# Patient Record
Sex: FEMALE | Race: OTHER
[De-identification: names, ages, dates, MRNs, and addresses within clinical notes are randomized per-mention and may not be internally consistent; named-entity substitution may affect disease eponyms.]

---

## 2018-04-07 ENCOUNTER — HOSPITAL ENCOUNTER (EMERGENCY)
Dept: HOSPITAL 62 - ER | Age: 23
Discharge: HOME | End: 2018-04-07
Payer: SELF-PAY

## 2018-04-07 VITALS — DIASTOLIC BLOOD PRESSURE: 69 MMHG | SYSTOLIC BLOOD PRESSURE: 108 MMHG

## 2018-04-07 DIAGNOSIS — N76.4: Primary | ICD-10-CM

## 2018-04-07 DIAGNOSIS — N76.0: ICD-10-CM

## 2018-04-07 PROCEDURE — 0H9AXZZ DRAINAGE OF INGUINAL SKIN, EXTERNAL APPROACH: ICD-10-PCS

## 2018-04-07 PROCEDURE — 99283 EMERGENCY DEPT VISIT LOW MDM: CPT

## 2018-04-07 NOTE — ER DOCUMENT REPORT
ED Skin Rash/Insect Bite/Abscs





- General


Chief Complaint: Abscess


Stated Complaint: BUMP


Time Seen by Provider: 04/07/18 17:25


Mode of Arrival: Ambulatory


Information source: Patient


TRAVEL OUTSIDE OF THE [place] IN LAST 30 DAYS: No





- HPI


Patient complains to provider of: Tender/swollen area


Notes: 





Patient is here with complaints of tender swollen area in her vaginal area for 

the last few days.  States it seems to be getting bigger and more painful.  She 

denies any fevers.  She does report a prior history of having abscess in the 

past.  States that this was approximately 2 years ago.  She denies any nausea, 

vomiting, diarrhea.  No abdominal pain.  No dysuria or hematuria.  No 

difficulty urinating.  Pain is worse with walking and touching the area, 

nothing seems to make it better.  The patient denies any further complaints at 

this time.





- Related Data


Allergies/Adverse Reactions: 


 





No Known Allergies Allergy (Verified 04/07/18 17:05)


 











Past Medical History





- Social History


Smoking Status: Unknown if Ever Smoked


Family History: Reviewed & Not Pertinent


Pulmonary Medical History: 


   Denies: Hx Asthma


Neurological Medical History: Reports: Hx Migraine


GI Medical History: Denies: Hx Gastritis, Hx Gastroesophageal Reflux Disease


Musculoskeltal Medical History: Denies Hx Arthritis, Denies Hx Fibromyalgia, 

Denies Hx Muscular Dystrophy, Denies Hx Musculoskeletal Trauma


Psychiatric Medical History: 


   Denies: Hx Anxiety


Past Surgical History: Reports: Hx Oral Surgery





- Immunizations


Immunizations up to date: Yes


Hx Diphtheria, Pertussis, Tetanus Vaccination: Yes - 2012





Review of Systems





- Review of Systems


-: Yes All other systems reviewed and negative





Physical Exam





- Vital signs


Vitals: 


 











Temp Pulse Resp BP Pulse Ox


 


 97.9 F   70   14   108/69   100 


 


 04/07/18 17:15  04/07/18 17:15  04/07/18 17:15  04/07/18 17:15  04/07/18 17:15














- Notes


Notes: 





GENERAL: alert, cooperative, nontoxic, no distress.


HEAD: normocephalic, atraumatic


EYES: conjunctiva pink without discharge, no external redness or swelling.


EARS: no external swelling, no external redness


NOSE: atraumatic, no external swelling


MOUTH/THROAT: mucous membranes moist and pink, posterior pharynx without 

erythema, swelling, exudate. No trismus or drooling.


NECK: soft, supple, full range of motion, no meningismus.


CHEST: no distress, lungs clear and equal throughout.  No wheezing, rales, 

rhonchi.


CARDIAC: regular rate and rhythm, no murmur, normal capillary refill, normal 

pulses.  No peripheral edema noted.


ABDOMEN: Soft, nontender.


BACK: full range of motion, no CVA tenderness.


EXTREMITIES: full range of motion of all extremities.  No redness, no swelling.


NEURO: alert and oriented x 3, no focal deficits, full range of motion of all 

extremities.


PYSCH: appropriate mood, affect.  Patient is cooperative.


SKIN: pink, warm, dry, no rash.


: Performed with female chaperone at the bedside.  Patient is noted to have a 

1 cm abscess to the labia minora at the superior aspect on the left side.  No 

significant cellulitis identified.  This is tender to palpation.  It does not 

involve the clitoris.  It does not involve the urethra.





Course





- Re-evaluation


Re-evalutation: 





04/07/18 18:46


Patient is noted to have a small abscess to the left aspect of the labia minora 

superior aspect with no involvement of the clitoris or the urethra.  Was able 

to drain the abscess here in the emergency department.  Patient will be 

discharged home with a prescription for Bactrim and instructions to apply warm 

compresses to the sore area.  She declined needing anything for pain, she 

states she will take Tylenol Motrin as needed.  Patient is instructed to follow-

up if she does not seem to be improving in the next 2 days, sooner for 

increasing pain, fever, redness, drainage, any further concerns.





The patient's emergency department workup and current diagnosis were explained 

to the patient and or family.  Follow-up instructions were provided.  

Medications if prescribed were discussed. Instructions for when to return to 

the emergency department including specific  worrisome symptoms were discussed 

with the patient and/or family.





- Vital Signs


Vital signs: 


 











Temp Pulse Resp BP Pulse Ox


 


 97.9 F   70   14   108/69   100 


 


 04/07/18 17:15  04/07/18 17:15  04/07/18 17:15  04/07/18 17:15  04/07/18 17:15














Procedures





- Incision and Drainage


  ** Labial


Type: Simple


Anesthetic type: 1% Lidocaine


Blade size: 11


I&D procedure: Chlorprep applied


Incision Method: Incision made by scalpel


Amount/type of drainage: small, purulent


Notes: 





04/07/18 18:47


Loculations broke up with hemostats.  Abscess cavity irrigated with normal 

saline.  Patient tolerated procedure well with no immediate complications.





Discharge





- Discharge


Condition: Stable


Disposition: HOME, SELF-CARE


Instructions:  Abscess ([facility]), Post Incision and Drainage, Trimethoprim-Sulfa (

[facility])


Additional Instructions: 


Take medications as prescribed.  Apply warm compresses to sore area.  Follow-up 

if not improving in the next 2 days, sooner for increasing pain, fever, redness

, drainage, any further concerns.


Prescriptions: 


Sulfamethoxazole/Trimethoprim [Bactrim Ds Tablet] 1 each PO BID #20 tablet


Forms:  Smoking Cessation Education


Referrals: 


[facility] [Provider Group] - Follow up as needed

## 2019-02-18 ENCOUNTER — HOSPITAL ENCOUNTER (EMERGENCY)
Dept: HOSPITAL 62 - ER | Age: 24
Discharge: HOME | End: 2019-02-18
Payer: SELF-PAY

## 2019-02-18 VITALS — DIASTOLIC BLOOD PRESSURE: 81 MMHG | SYSTOLIC BLOOD PRESSURE: 119 MMHG

## 2019-02-18 DIAGNOSIS — R19.7: ICD-10-CM

## 2019-02-18 DIAGNOSIS — O26.891: ICD-10-CM

## 2019-02-18 DIAGNOSIS — O21.9: Primary | ICD-10-CM

## 2019-02-18 DIAGNOSIS — Z3A.01: ICD-10-CM

## 2019-02-18 DIAGNOSIS — O26.851: ICD-10-CM

## 2019-02-18 LAB
APPEARANCE UR: (no result)
APTT PPP: YELLOW S
BILIRUB UR QL STRIP: NEGATIVE
GLUCOSE UR STRIP-MCNC: NEGATIVE MG/DL
KETONES UR STRIP-MCNC: 80 MG/DL
NITRITE UR QL STRIP: NEGATIVE
PH UR STRIP: 5 [PH] (ref 5–9)
PROT UR STRIP-MCNC: 30 MG/DL
SP GR UR STRIP: 1.03
UROBILINOGEN UR-MCNC: 4 MG/DL (ref ?–2)

## 2019-02-18 PROCEDURE — 36415 COLL VENOUS BLD VENIPUNCTURE: CPT

## 2019-02-18 PROCEDURE — 81001 URINALYSIS AUTO W/SCOPE: CPT

## 2019-02-18 PROCEDURE — 86900 BLOOD TYPING SEROLOGIC ABO: CPT

## 2019-02-18 PROCEDURE — 84702 CHORIONIC GONADOTROPIN TEST: CPT

## 2019-02-18 PROCEDURE — 86901 BLOOD TYPING SEROLOGIC RH(D): CPT

## 2019-02-18 PROCEDURE — 87086 URINE CULTURE/COLONY COUNT: CPT

## 2019-02-18 PROCEDURE — 81025 URINE PREGNANCY TEST: CPT

## 2019-02-18 PROCEDURE — 76817 TRANSVAGINAL US OBSTETRIC: CPT

## 2019-02-18 PROCEDURE — 96361 HYDRATE IV INFUSION ADD-ON: CPT

## 2019-02-18 PROCEDURE — 96374 THER/PROPH/DIAG INJ IV PUSH: CPT

## 2019-02-18 PROCEDURE — 99284 EMERGENCY DEPT VISIT MOD MDM: CPT

## 2019-02-18 NOTE — RADIOLOGY REPORT (SQ)
EXAM DESCRIPTION:  U/S OB TRANSVAGINAL W/O DOP



COMPLETED DATE/TIME:  2/18/2019 3:20 pm



REASON FOR STUDY:  vaginal bleeding, + hcg



COMPARISON:  None.



TECHNIQUE:  Transvaginal static and realtime grayscale images acquired of the pelvis. Additional sandy
cted spectral and color Doppler images recorded. All images stored on PACs.

bHCG: Pending

CLINICAL DATES:  LMP 1/3/2019.  6 weeks 4 days.



LIMITATIONS:  None.



FINDINGS:  FETUS:  Single Living intrauterine pregnancy.

ULTRASOUND EGA: 5 weeks 6 days

ULTRASOUND JUAN C: 10/15/2019

EFW: Not applicable less than 20 weeks.

CRL:  0.25 cm

FHR: 104  beats per minute.

FETAL SURVEY: Too early to assess.

AMNIOTIC FLUID: Adequate amount.

PLACENTA: Not yet developed due to early gestation.

SUBCHORIONIC BLEED: No

SIZE OF BLEED: Not applicable.

UTERUS: No masses. No anomalies.

CERVICAL LENGTH: 3.4 cm.   Closed.

RIGHT ADNEXA: Normal ovary with normal vascular flow.  2.8 x 2.4 x 1.6 cm.

No adnexal free fluid.

No adnexal masses.

LEFT ADNEXA: Normal ovary with normal vascular flow.  3.8 x 2.2 x 2.6 cm.

No adnexal free fluid.

No adnexal masses.

FREE FLUID: None.

OTHER: No other significant finding.



IMPRESSION:  LIVING INTRAUTERINE PREGNANCY.

EGA 5 weeks 6 days.

Trimester of pregnancy:  First - 0 to 13 weeks.



TECHNICAL DOCUMENTATION:  JOB ID:  [id number]

 2011 [employer]- All Rights Reserved                            rev-5/18



Reading location - IP/workstation name: [id number]

## 2019-02-18 NOTE — ER DOCUMENT REPORT
ED General





- General


Chief Complaint: Nausea/Vomiting/Diarrhea


Stated Complaint: THROWING UP


Time Seen by Provider: 02/18/19 10:28


Primary Care Provider: 


[facility] [Provider Group] - Follow up as needed


[facility] - Follow up as needed


Notes: 





Patient is a 23-year-old female that presents to the emergency department for 

chief complaint of nausea, vomiting, diarrhea.  Patient reports that her 

symptoms started last Wednesday and got worse over the weekend, and she feels 

that she is getting dehydrated, complaining of a mild headache as well so she 

decided to come to the emergency department.  She states that her boyfriend had 

similar symptoms, but his of since resolved.  She is not sure that she is 

pregnant, her first day of last menstrual period was the third of last month.  

She denies any significant abdominal pain, chest pain, shortness of breath, 

fevers, chills, night sweats, dysuria or hematuria.  She currently rates her 

headache as a 2 out of 10 describes as an aching sensation.





Past Medical History: Denies chronic medical conditions


Past Surgical History: [place] tooth extraction


Social History: Admits to occasional alcohol use, denies tobacco or drug use.


Family History: Reviewed and noncontributory for presenting illness


Allergies: Reviewed, see documented allergy list. 





REVIEW OF SYSTEMS:


Other than noted above, the 12 point review of systems was reviewed with the 

patient and were negative, all pertinent findings are included in the HPI.





PHYSICAL EXAMINATION:





Vital signs reviewed, nursing noted reviewed. 





GENERAL: Well-appearing, well-nourished and in no acute distress.





HEAD: Atraumatic, normocephalic.





EYES: Eyes appear normal, extraocular movements intact, sclera anicteric, 

conjunctiva are normal.





ENT: nares patent, oropharynx clear without exudates.  Moist mucous membranes.





NECK: Normal range of motion, supple without lymphadenopathy





LUNGS: Breath sounds clear to auscultation bilaterally and equal.  No wheezes 

rales or rhonchi.





HEART: Regular rate and rhythm without murmurs





ABDOMEN: Soft, nontender, normoactive bowel sounds.  No rebound, guarding, or 

rigidity. No masses appreciated.





EXTREMITIES: Nontender, good range of motion, no pitting or edema.  





NEUROLOGICAL: No focal neurological deficits. Moves all extremities 

spontaneously Motor and sensory grossly intact on exam.





PSYCH: Normal mood, normal affect.





SKIN: Warm, Dry, normal turgor, no rashes or lesions noted on exposed skin





TRAVEL OUTSIDE OF THE [place] IN LAST 30 DAYS: No





- Related Data


Allergies/Adverse Reactions: 


                                        





No Known Allergies Allergy (Verified 02/18/19 10:14)


   











Past Medical History





- Social History


Smoking Status: Never Smoker


Chew tobacco use (# tins/day): No


Frequency of alcohol use: Occasional


Drug Abuse: None


Family History: Reviewed & Not Pertinent


Patient has suicidal ideation: No


Patient has homicidal ideation: No


Pulmonary Medical History: 


   Denies: Hx Asthma


Neurological Medical History: Reports: Hx Migraine


Renal/ Medical History: Denies: Hx Peritoneal Dialysis


GI Medical History: Denies: Hx Gastritis, Hx Gastroesophageal Reflux Disease


Musculoskeletal Medical History: Denies Hx Arthritis, Denies Hx Fibromyalgia, 

Denies Hx Muscular Dystrophy, Denies Hx Musculoskeletal Trauma


Psychiatric Medical History: 


   Denies: Hx Anxiety


Past Surgical History: Reports: Hx Oral Surgery





- Immunizations


Immunizations up to date: Yes


Hx Diphtheria, Pertussis, Tetanus Vaccination: Yes - 2012





Physical Exam





- Vital signs


Vitals: 


                                        











Temp Pulse Resp BP Pulse Ox


 


 99.0 F   80   16   105/69   99 


 


 02/18/19 10:24  02/18/19 10:24  02/18/19 10:24  02/18/19 10:24  02/18/19 10:24














Course





- Re-evaluation


Re-evalutation: 





Patient seen and examined vital signs reviewed. 





Laboratory data and imaging were ordered as appropriate for the patient's 

presenting symptoms and complaint, with consideration of any critical or life 

threatening conditions that may be associated with their obtained history and 

exam as noted above.





Patient was treated with IV fluids, dose of Zofran 4 mg IV





Results were reviewed when available and demonstrated urinalysis was essentially

unremarkable, hCG in the urine was positive, because the patient was complaining

of some vaginal spotting, I ordered ultrasound, which confirmed an IUP, hCG was 

ordered as well, consistent with the patient stage of pregnancy.





The patient was re-evaluated and was stable and improved





Evaluation was most consistent with pregnancy, nausea and vomiting, advised to 

follow-up with OB/GYN.





Results were discussed with the patient at this point, after careful 

consideration I feel that that patient can be discharged from the emergency 

department, the patient was educated treatments and reasons to return to the e

mergency department based on their presumed diagnosis as noted above, they were 

advised to followup with a primary care physician in 2-3 days. Patient was 

agreeable to plan of care.





*Note is created using voice recognition software and may contain spelling, 

syntax or grammatical errors.











Laboratory











  02/18/19 02/18/19 02/18/19





  11:39 11:39 14:24


 


Beta HCG, Quant   


 


Total Beta HCG   


 


Urine Color  YELLOW  


 


Urine Appearance  SLIGHTLY-CLOUDY  


 


Urine pH  5.0  


 


Ur Specific Gravity  1.027  


 


Urine Protein  30 H  


 


Urine Glucose (UA)  NEGATIVE  


 


Urine Ketones  80 H  


 


Urine Blood  LARGE H  


 


Urine Nitrite  NEGATIVE  


 


Urine Bilirubin  NEGATIVE  


 


Urine Urobilinogen  4.0 H  


 


Ur Leukocyte Esterase  NEGATIVE  


 


Urine WBC (Auto)  3  


 


Urine RBC (Auto)  2  


 


U Hyaline Cast (Auto)  1  


 


Urine Bacteria (Auto)  NP  


 


Urine Red Cell Clumps  NP  


 


Urine WBC Clumps  NP  


 


Squamous Epi Cells Auto  1  


 


U Non-Squamous Epis Auto  NP  


 


Calcium Carbonate Cryst  NP  


 


Calcium Phosphate Cryst  NP  


 


Calcium Oxalate Cr Auto  NP  


 


Leucine Crystals  NP  


 


Cystine Crystals  NP  


 


Uric Acid Cryst (Auto)  NP  


 


Triple Phos Cryst (Auto)  NP  


 


Tyrosine Crystals  NP  


 


Amorphous Sediment Auto  NP  


 


Cellular Casts  NP  


 


Epithelial Casts (Auto)  NP  


 


Fatty Casts  NP  


 


Granular Casts (Auto)  NP  


 


Waxy Casts (Auto)  NP  


 


Broad Casts  NP  


 


RBC Casts (Auto)  NP  


 


WBC Casts (Auto)  NP  


 


Urine Mucus (Auto)  MANY  


 


U Trichomonas (Auto)  NP  


 


Ur Yeast w Hyphae  NP  


 


Urine Yeast (Budding)  NP  


 


Urine Ascorbic Acid  NEGATIVE  


 


Urine HCG, Qual   POSITIVE H 


 


Blood Type    O POSITIVE


 


Rhogam Indicated    RHOGAM NOT INDICATED














  02/18/19





  14:24


 


Beta HCG, Quant  86859.00 H


 


Total Beta HCG  POSITIVE


 


Urine Color 


 


Urine Appearance 


 


Urine pH 


 


Ur Specific Gravity 


 


Urine Protein 


 


Urine Glucose (UA) 


 


Urine Ketones 


 


Urine Blood 


 


Urine Nitrite 


 


Urine Bilirubin 


 


Urine Urobilinogen 


 


Ur Leukocyte Esterase 


 


Urine WBC (Auto) 


 


Urine RBC (Auto) 


 


U Hyaline Cast (Auto) 


 


Urine Bacteria (Auto) 


 


Urine Red Cell Clumps 


 


Urine WBC Clumps 


 


Squamous Epi Cells Auto 


 


U Non-Squamous Epis Auto 


 


Calcium Carbonate Cryst 


 


Calcium Phosphate Cryst 


 


Calcium Oxalate Cr Auto 


 


Leucine Crystals 


 


Cystine Crystals 


 


Uric Acid Cryst (Auto) 


 


Triple Phos Cryst (Auto) 


 


Tyrosine Crystals 


 


Amorphous Sediment Auto 


 


Cellular Casts 


 


Epithelial Casts (Auto) 


 


Fatty Casts 


 


Granular Casts (Auto) 


 


Waxy Casts (Auto) 


 


Broad Casts 


 


RBC Casts (Auto) 


 


WBC Casts (Auto) 


 


Urine Mucus (Auto) 


 


U Trichomonas (Auto) 


 


Ur Yeast w Hyphae 


 


Urine Yeast (Budding) 


 


Urine Ascorbic Acid 


 


Urine HCG, Qual 


 


Blood Type 


 


Rhogam Indicated 











                                        





Obstetrics Ultrasound  02/18/19 13:20


IMPRESSION:  LIVING INTRAUTERINE PREGNANCY.


EGA 5 weeks 6 days.


Trimester of pregnancy:  First - 0 to 13 weeks.


 














- Vital Signs


Vital signs: 


                                        











Temp Pulse Resp BP Pulse Ox


 


 98.6 F   77   18   119/81   100 


 


 02/18/19 16:51  02/18/19 16:51  02/18/19 16:51  02/18/19 16:51  02/18/19 16:51














- Laboratory


Laboratory results interpreted by me: 


                                        











  02/18/19 02/18/19 02/18/19





  11:39 11:39 14:24


 


Beta HCG, Quant    24352.00 H


 


Urine Protein  30 H  


 


Urine Ketones  80 H  


 


Urine Blood  LARGE H  


 


Urine Urobilinogen  4.0 H  


 


Urine HCG, Qual   POSITIVE H 














Discharge





- Discharge


Clinical Impression: 


Nausea & vomiting


Qualifiers:


 Vomiting type: unspecified Vomiting Intractability: non-intractable Qualified 

Code(s): R11.2 - Nausea with vomiting, unspecified





Pregnancy


Qualifiers:


 Weeks of gestation: less than 8 weeks Qualified Code(s): Z3A.01 - Less than 8 

weeks gestation of pregnancy





Condition: Stable


Disposition: HOME, SELF-CARE


Instructions:  Pregnancy ([facility]), Vomiting ([facility])


Additional Instructions: 


Please follow-up with women's health, or the health department, you should avoid

alcohol going forward, you should start taking a prenatal vitamin on a daily 

basis, avoid cigarette smoke, even if it secondhand smoke.








Referrals: 


[facility] [Provider Group] - Follow up as needed


[facility] - Follow up as needed

## 2019-04-07 ENCOUNTER — HOSPITAL ENCOUNTER (EMERGENCY)
Dept: HOSPITAL 62 - ER | Age: 24
Discharge: HOME | End: 2019-04-07
Payer: MEDICAID

## 2019-04-07 VITALS — DIASTOLIC BLOOD PRESSURE: 61 MMHG | SYSTOLIC BLOOD PRESSURE: 103 MMHG

## 2019-04-07 DIAGNOSIS — G43.001: Primary | ICD-10-CM

## 2019-04-07 DIAGNOSIS — H53.149: ICD-10-CM

## 2019-04-07 DIAGNOSIS — R11.2: ICD-10-CM

## 2019-04-07 PROCEDURE — 96361 HYDRATE IV INFUSION ADD-ON: CPT

## 2019-04-07 PROCEDURE — 96374 THER/PROPH/DIAG INJ IV PUSH: CPT

## 2019-04-07 PROCEDURE — 99283 EMERGENCY DEPT VISIT LOW MDM: CPT

## 2019-04-07 NOTE — ER DOCUMENT REPORT
ED Headache





- General


Chief Complaint: Headache


Stated Complaint: HEADACHE


Time Seen by Provider: 04/07/19 09:16


Mode of Arrival: Ambulatory


Information source: Patient


Notes: 





Patient is a 23-year-old female comes to the emergency room today complaining of

migraine headache times 1 week.  Patient has a history of migraine headaches and

states she usually gets 1 or 2 a week but takes Tylenol and it goes away.  She 

is been recently diagnosed as being pregnant on February 1 and was found to be 

pregnant here at this hospital.  This is her first pregnancy and since the 

pregnancy started she has had vomiting along with these headaches which have 

been increasing.  These headaches and the presentation are similar to all her 

past history of headaches is just not going away with the Tylenol.  States the 

headache is frontal and causes photophobia.  Also causes nausea and vomiting.  

She denies any abdominal pain cramping or bleeding.  She denies smoking she 

works currently as a  at the [employer].  She denies any other 

medical history.


TRAVEL OUTSIDE OF THE [place] IN LAST 30 DAYS: No





- HPI


Patient complains to provider of: Headache, "Migraine"


Patient reports: Frequent migraines, Hx chronic headaches


Onset: Last week


Onset was: Abrupt - Works for me final viewing over 5


Timing: Worse


Quality of pain: Achy, Sharp, Throbbing


Severity: Severe


Pain Level: 5


Preceding symptoms: Other - Photophobia


Associated symptoms: Nausea/vomiting.  denies: Motor/sensory loss to leg, Neck 

pain


Exacerbated by: Light, Noise, Movement


Similar symptoms previously: Yes


Recently seen / treated by doctor: No





- Related Data


Allergies/Adverse Reactions: 


                                        





No Known Allergies Allergy (Verified 02/18/19 10:14)


   











Past Medical History





- General


Information source: Patient





- Social History


Smoking Status: Never Smoker


Chew tobacco use (# tins/day): No


Frequency of alcohol use: None


Drug Abuse: None


Lives with: Family


Family History: Reviewed & Not Pertinent


Patient has suicidal ideation: No


Patient has homicidal ideation: No


Pulmonary Medical History: 


   Denies: Hx Asthma


Neurological Medical History: Reports: Hx Migraine


Renal/ Medical History: Denies: Hx Peritoneal Dialysis


GI Medical History: Denies: Hx Gastritis, Hx Gastroesophageal Reflux Disease


Musculoskeletal Medical History: Denies Hx Arthritis, Denies Hx Fibromyalgia, 

Denies Hx Muscular Dystrophy, Denies Hx Musculoskeletal Trauma


Psychiatric Medical History: 


   Denies: Hx Anxiety


Past Surgical History: Reports: Hx Oral Surgery - wisdom teeth removed





- Immunizations


Immunizations up to date: Yes


Hx Diphtheria, Pertussis, Tetanus Vaccination: Yes - 2012





Review of Systems





- Review of Systems


Constitutional: No symptoms reported


EENT: No symptoms reported


Cardiovascular: No symptoms reported


Respiratory: No symptoms reported


Gastrointestinal: No symptoms reported


Genitourinary: No symptoms reported


Female Genitourinary: No symptoms reported, Pregnant


Musculoskeletal: No symptoms reported


Skin: No symptoms reported


Hematologic/Lymphatic: No symptoms reported


Neurological/Psychological: No symptoms reported, Headaches


-: Yes All other systems reviewed and negative





Physical Exam





- Vital signs


Vitals: 


                                        











Temp Pulse Resp BP Pulse Ox


 


 98.0 F   90   16   109/64   99 


 


 04/07/19 08:26  04/07/19 08:26  04/07/19 08:26  04/07/19 08:26  04/07/19 08:26











Interpretation: Normal





- Notes


Notes: 


PHYSICAL EXAMINATION:





GENERAL: Well-appearing, well-nourished and in no acute distress.  But 

uncomfortable appearing.





HEAD: Atraumatic, normocephalic.





EYES: Pupils equal round and reactive to light, extraocular movements intact, 

conjunctiva are normal.





ENT: Nares patent, oropharynx clear without exudates.  Moist mucous membranes.





NECK: Normal range of motion, supple without lymphadenopathy





LUNGS: Breath sounds clear to auscultation bilaterally and equal.  No wheezes 

rales or rhonchi.





HEART: Regular rate and rhythm without murmurs





ABDOMEN: Soft, nontender, nondistended abdomen.  No guarding, no rebound.  No 

masses appreciated.





Female : deferred





Musculoskeletal: Normal range of motion, no pitting or edema.  No cyanosis.





NEUROLOGICAL: Cranial nerves grossly intact.  Normal speech, normal gait.  

Normal sensory, motor exams





PSYCH: Normal mood, normal affect.





SKIN: Warm, Dry, normal turgor, no rashes or lesions noted.





Course





- Re-evaluation


Re-evalutation: 





04/07/19 09:35


Patient has a history of migraine headaches normally responding to Tylenol 

however since her pregnancy starting around February she is been having 

increasing migraine headaches that are more difficult to handle.  This wound is 

been ongoing on for a week and she is attempted Tylenol multiple times but her 

pain is getting worse her headache is similar to all of her other ones in the 

past intensity is approximately the same issues not responding to medication.  

At this time we will try a liter of fluid some Benadryl and Tylenol p.o. since 

last though she took was late yesterday evening around 11 PM.  We will continue 

to monitor and adjust treatment as needed.  Patient received a liter of fluids 

and the Benadryl and her pain came down to a 2 out of 10 she slept most of the 

morning and feels better.  We will discharge patient home with the intent that 

she can take Benadryl every 6 hours as she needed for relief of the headache.  

She can go home and sleep.


04/07/19 12:09








- Vital Signs


Vital signs: 


                                        











Temp Pulse Resp BP Pulse Ox


 


 98.0 F   90   16   109/64   99 


 


 04/07/19 08:26  04/07/19 08:26  04/07/19 08:26  04/07/19 08:26  04/07/19 08:26














Discharge





- Discharge


Clinical Impression: 


Migraine headache


Qualifiers:


 Migraine type: without aura Status migrainosus presence: with status 

migrainosus Intractability: not intractable Qualified Code(s): G43.001 - 

Migraine without aura, not intractable, with status migrainosus





Pregnancy


Qualifiers:


 Weeks of gestation: 12 weeks Qualified Code(s): Z3A.12 - 12 weeks gestation of 

pregnancy





Condition: Good


Disposition: HOME, SELF-CARE


Instructions:  Antinausea Medication ([facility]), Use of Diphenhydramine, Headache 

([facility]), Pregnancy ([facility])


Additional Instructions: 


Home and rest.  Continue with fluids as much as possible for the next 24 hours. 

Advance her diet as tolerated.  I am giving you something for your nausea which 

may also will subside with your pregnancy.  Continue her Tylenol every 8 hours 

as needed for the headache.  Return to ER if you have any concerns or problems. 

Highly suggest follow-up with a neurologist at your convenience.  Also need to 

establish with your OB/GYN as soon as possible.  Continue with your prenatal 

vitamins.


Prescriptions: 


Promethazine HCl [Phenergan 25 mg Tablet] 1 - 2 tab PO Q6H PRN #20 tablet


 PRN Reason: